# Patient Record
Sex: FEMALE | Race: WHITE | Employment: FULL TIME | URBAN - METROPOLITAN AREA
[De-identification: names, ages, dates, MRNs, and addresses within clinical notes are randomized per-mention and may not be internally consistent; named-entity substitution may affect disease eponyms.]

---

## 2017-09-07 ENCOUNTER — PROCEDURE VISIT (OUTPATIENT)
Dept: DERMATOLOGY | Age: 32
End: 2017-09-07

## 2017-09-07 DIAGNOSIS — Z41.1 ELECTIVE PROCEDURE FOR UNACCEPTABLE COSMETIC APPEARANCE: Primary | ICD-10-CM

## 2017-09-07 PROCEDURE — MISCHAIR2 LASER HAIR REMOVAL - FACE PER ZONE: Performed by: DERMATOLOGY

## 2018-01-09 ENCOUNTER — OFFICE VISIT (OUTPATIENT)
Dept: DERMATOLOGY | Age: 33
End: 2018-01-09

## 2018-01-09 DIAGNOSIS — L70.0 ACNE VULGARIS: ICD-10-CM

## 2018-01-09 DIAGNOSIS — D22.9 MULTIPLE NEVI: Primary | ICD-10-CM

## 2018-01-09 DIAGNOSIS — L81.4 LENTIGINES: ICD-10-CM

## 2018-01-09 DIAGNOSIS — D18.00 ANGIOMA: ICD-10-CM

## 2018-01-09 PROCEDURE — G8484 FLU IMMUNIZE NO ADMIN: HCPCS | Performed by: DERMATOLOGY

## 2018-01-09 PROCEDURE — G8427 DOCREV CUR MEDS BY ELIG CLIN: HCPCS | Performed by: DERMATOLOGY

## 2018-01-09 PROCEDURE — 99214 OFFICE O/P EST MOD 30 MIN: CPT | Performed by: DERMATOLOGY

## 2018-01-09 PROCEDURE — G8421 BMI NOT CALCULATED: HCPCS | Performed by: DERMATOLOGY

## 2018-01-09 PROCEDURE — 1036F TOBACCO NON-USER: CPT | Performed by: DERMATOLOGY

## 2018-01-09 RX ORDER — CLINDAMYCIN PHOSPHATE 10 UG/ML
LOTION TOPICAL
Qty: 60 ML | Refills: 3 | Status: SHIPPED | OUTPATIENT
Start: 2018-01-09

## 2018-01-09 NOTE — PROGRESS NOTES
scattered brown macules and papules   Cheeks with few tiny red macules  Back with scattered erythematous papules and few pustules; face with few erythematous papules and few closed comedones    Assessment and Plan     1. Benign-appearing nevi and few faint lentigines on the anterior neck  - educ re ABCD's of MM   educ sun protection   encouraged skin check yearly (sooner if indicated), self checks  - ed laser or LN2 for lentigines if darkening    2. Angiomas  - ed removal with Vbeam - 100    3.  Acne - back > face, mild inflammatory  - add clinda pads bid  - ed panoxyl wash - discussed irritation, bleaching  - consider differin/retin for the face

## 2018-08-16 ENCOUNTER — TELEPHONE (OUTPATIENT)
Dept: DERMATOLOGY | Age: 33
End: 2018-08-16

## 2018-08-16 NOTE — TELEPHONE ENCOUNTER
LVM for patient to return call to schedule laser.     Angiomas or brown spot?    [phone] patient's mobile

## 2018-08-23 ENCOUNTER — TELEPHONE (OUTPATIENT)
Dept: DERMATOLOGY | Age: 33
End: 2018-08-23

## 2018-09-06 ENCOUNTER — OFFICE VISIT (OUTPATIENT)
Dept: DERMATOLOGY | Age: 33
End: 2018-09-06

## 2018-09-06 DIAGNOSIS — L68.9 EXCESSIVE HAIR: Primary | ICD-10-CM

## 2018-09-06 DIAGNOSIS — Z41.1 ELECTIVE PROCEDURE FOR UNACCEPTABLE COSMETIC APPEARANCE: ICD-10-CM

## 2018-09-06 PROCEDURE — DM01300 VBEAM LASER EXTRA SMALL AREA: Performed by: DERMATOLOGY

## 2018-09-06 NOTE — PROGRESS NOTES
Laser Procedure Note       Sophia Vleazquez   YOB: 1985    DATE OF VISIT:  2018     *moving to [place] in a few mos    LASER: GentleLase   DIAGNOSIS: excess hair - glabella #4; last treated  - 12 mos ago + chin     She has 2 daughters - Quiana Fields (born in May 2016) and a 3 yo daughter Chelsea Niplynn. Occupation:  Works at [employer] in [employer] [email or web address] - . Here for elective trx of excess hair on the glabellar area. Typically plucks this area and had 3 previous laser treatment to this area w/o complications and with improvement. She has had laser hair removal in the bikini area and tolerated this well without any side effects. (several years ago - another provider)    Discussed that \"shaping\" of the brow is contraindicated - risk of alopecic area; discussed the need for multiple treatments, risk of incomplete clearance and recurrence and other risks of this pigmentation. PATIENT IDENTIFIED PER PROTOCOL: yes  LOCATION(S): face  VERIFIED AND MARKED: yes  TECHNIQUES, RISKS, BENEFITS AND ALTERNATIVES EXPLAINED: yes  CONSENT SIGNED, WITNESSED AND DATED: yes        OPERATIVE REPORT    DIAGNOSIS, LOCATION, PROCEDURE RECONFIRMED: yes   EYE PROTECTION: yes  ANESTHESIA/PRE-OP MEDICATIONS: none  LASER SETTINGS:  (1)  WAVELENGTH: 755 - GentleLASE LENS: 15  FLUENCE: 30   COOLIN/40    PROCEDURE NOTE:  Glabella and chin treated with several pulses with vaporization of hairs. No complications noted. POST-OPERATIVE CARE/DISPOSITION: ice prn  COMPLICATIONS: none  MEDICATIONS: none  WOUND CARE INSTRUCTIONS PROVIDED: yes    F/u 2-3 mos prn.     100

## 2018-10-04 ENCOUNTER — PROCEDURE VISIT (OUTPATIENT)
Dept: DERMATOLOGY | Age: 33
End: 2018-10-04

## 2018-10-04 DIAGNOSIS — L68.9 EXCESSIVE HAIR: Primary | ICD-10-CM

## 2018-10-04 DIAGNOSIS — Z41.1 ELECTIVE PROCEDURE FOR UNACCEPTABLE COSMETIC APPEARANCE: ICD-10-CM

## 2018-10-04 PROCEDURE — DM01300 VBEAM LASER EXTRA SMALL AREA: Performed by: DERMATOLOGY

## 2018-10-04 NOTE — PROGRESS NOTES
Laser Procedure Note       Mallory Lipoma   YOB: 1985    DATE OF VISIT:  10/4/2018     Last seen:   *moving to [place] in a few weeks  Chief Complaint   Patient presents with    Laser Treatment     hair removal on chin and glabella     LASER: GentleLase   DIAGNOSIS: excess hair - glabella #5; last treated  - 12 mos ago + chin #3    She has 2 daughters - Flavia Brandon (born in May 2016) and a 3 yo daughter Neville Burnette. Occupation:  Works at [employer] in [employer] [place] - . Here for elective trx of excess hair on the glabellar area + chin. Typically plucks this area and had 3 previous laser treatment to this area w/o complications and with improvement. She has had laser hair removal in the bikini area and tolerated this well without any side effects. (several years ago - another provider)    Discussed that \"shaping\" of the brow is contraindicated - risk of alopecic area; discussed the need for multiple treatments, risk of incomplete clearance and recurrence and other risks of this pigmentation. PATIENT IDENTIFIED PER PROTOCOL: yes  LOCATION(S): face  VERIFIED AND MARKED: yes  TECHNIQUES, RISKS, BENEFITS AND ALTERNATIVES EXPLAINED: yes  CONSENT SIGNED, WITNESSED AND DATED: yes        OPERATIVE REPORT    DIAGNOSIS, LOCATION, PROCEDURE RECONFIRMED: yes   EYE PROTECTION: yes  ANESTHESIA/PRE-OP MEDICATIONS: none  LASER SETTINGS:  (1)  WAVELENGTH: 755 - GentleLASE LENS: 15  FLUENCE: 30   COOLIN/40    PROCEDURE NOTE:  Glabella and chin treated with several pulses with vaporization of hairs. No complications noted. POST-OPERATIVE CARE/DISPOSITION: ice prn  COMPLICATIONS: none  MEDICATIONS: none  WOUND CARE INSTRUCTIONS PROVIDED: yes    F/u prn. Moving to [place] later this month.     100